# Patient Record
Sex: FEMALE | Race: WHITE | HISPANIC OR LATINO | Employment: OTHER | URBAN - METROPOLITAN AREA
[De-identification: names, ages, dates, MRNs, and addresses within clinical notes are randomized per-mention and may not be internally consistent; named-entity substitution may affect disease eponyms.]

---

## 2023-09-06 PROBLEM — R91.1 LUNG NODULE: Status: ACTIVE | Noted: 2023-09-06

## 2023-09-06 PROBLEM — I50.9 CONGESTIVE HEART FAILURE (MULTI): Status: ACTIVE | Noted: 2023-09-06

## 2023-09-06 PROBLEM — I10 HYPERTENSION: Status: ACTIVE | Noted: 2023-09-06

## 2023-09-06 PROBLEM — M81.0 AGE-RELATED OSTEOPOROSIS WITHOUT CURRENT PATHOLOGICAL FRACTURE: Status: ACTIVE | Noted: 2023-09-06

## 2023-09-06 PROBLEM — M81.0 OSTEOPOROSIS: Status: ACTIVE | Noted: 2023-09-06

## 2023-09-06 PROBLEM — I24.1 POSTMYOCARDIAL INFARCTION SYNDROME (MULTI): Status: ACTIVE | Noted: 2023-09-06

## 2023-09-06 PROBLEM — I82.409 DVT (DEEP VENOUS THROMBOSIS) (MULTI): Status: ACTIVE | Noted: 2023-09-06

## 2023-09-06 PROBLEM — E78.49 OTHER HYPERLIPIDEMIA: Status: ACTIVE | Noted: 2023-09-06

## 2023-09-06 PROBLEM — E55.9 VITAMIN D DEFICIENCY: Status: ACTIVE | Noted: 2023-09-06

## 2023-09-06 PROBLEM — I25.10 ARTERIOSCLEROTIC HEART DISEASE: Status: ACTIVE | Noted: 2023-09-06

## 2023-09-06 PROBLEM — J96.90 RESPIRATORY FAILURE (MULTI): Status: ACTIVE | Noted: 2023-09-06

## 2023-09-06 PROBLEM — K21.9 ESOPHAGEAL REFLUX: Status: ACTIVE | Noted: 2023-09-06

## 2023-09-06 PROBLEM — E03.9 HYPOTHYROIDISM: Status: ACTIVE | Noted: 2023-09-06

## 2023-09-06 RX ORDER — LEVOTHYROXINE SODIUM 75 UG/1
1 TABLET ORAL DAILY
COMMUNITY
Start: 2017-08-25 | End: 2023-10-26 | Stop reason: WASHOUT

## 2023-09-06 RX ORDER — LEVOTHYROXINE SODIUM 88 UG/1
1 TABLET ORAL
COMMUNITY
End: 2023-10-26

## 2023-09-06 RX ORDER — ASPIRIN 81 MG/1
1 TABLET ORAL DAILY
COMMUNITY

## 2023-09-06 RX ORDER — CARVEDILOL 3.12 MG/1
1 TABLET ORAL 2 TIMES DAILY
COMMUNITY
Start: 2017-03-17 | End: 2023-12-18

## 2023-09-06 RX ORDER — FUROSEMIDE 40 MG/1
1 TABLET ORAL DAILY
COMMUNITY
Start: 2017-03-17 | End: 2023-12-18

## 2023-09-06 RX ORDER — ALENDRONATE SODIUM 70 MG/1
1 TABLET ORAL WEEKLY
COMMUNITY
End: 2023-12-04

## 2023-09-06 RX ORDER — ATORVASTATIN CALCIUM 20 MG/1
1 TABLET, FILM COATED ORAL DAILY
COMMUNITY
End: 2023-12-18

## 2023-09-06 RX ORDER — LISINOPRIL 2.5 MG/1
1 TABLET ORAL DAILY
COMMUNITY
Start: 2017-06-11 | End: 2023-12-18

## 2023-09-06 RX ORDER — PANTOPRAZOLE SODIUM 40 MG/1
1 TABLET, DELAYED RELEASE ORAL DAILY
COMMUNITY
Start: 2017-03-17 | End: 2023-12-18

## 2023-09-06 RX ORDER — TERIPARATIDE 250 UG/ML
20 INJECTION, SOLUTION SUBCUTANEOUS DAILY
COMMUNITY
Start: 2017-11-16 | End: 2024-01-08

## 2023-10-26 DIAGNOSIS — Z76.0 MEDICATION REFILL: ICD-10-CM

## 2023-10-26 RX ORDER — LEVOTHYROXINE SODIUM 88 UG/1
88 TABLET ORAL
Qty: 70 TABLET | Refills: 4 | Status: SHIPPED | OUTPATIENT
Start: 2023-10-26

## 2023-12-01 DIAGNOSIS — M81.0 OSTEOPOROSIS, UNSPECIFIED OSTEOPOROSIS TYPE, UNSPECIFIED PATHOLOGICAL FRACTURE PRESENCE: ICD-10-CM

## 2023-12-04 RX ORDER — ALENDRONATE SODIUM 70 MG/1
70 TABLET ORAL
Qty: 12 TABLET | Refills: 3 | Status: SHIPPED | OUTPATIENT
Start: 2023-12-04

## 2023-12-17 DIAGNOSIS — K21.9 GASTROESOPHAGEAL REFLUX DISEASE WITHOUT ESOPHAGITIS: ICD-10-CM

## 2023-12-17 DIAGNOSIS — E78.49 OTHER HYPERLIPIDEMIA: ICD-10-CM

## 2023-12-17 DIAGNOSIS — I10 PRIMARY HYPERTENSION: Primary | ICD-10-CM

## 2023-12-18 RX ORDER — PANTOPRAZOLE SODIUM 40 MG/1
40 TABLET, DELAYED RELEASE ORAL DAILY
Qty: 90 TABLET | Refills: 3 | Status: SHIPPED | OUTPATIENT
Start: 2023-12-18

## 2023-12-18 RX ORDER — FUROSEMIDE 40 MG/1
40 TABLET ORAL DAILY
Qty: 90 TABLET | Refills: 3 | Status: SHIPPED | OUTPATIENT
Start: 2023-12-18

## 2023-12-18 RX ORDER — LISINOPRIL 2.5 MG/1
2.5 TABLET ORAL DAILY
Qty: 90 TABLET | Refills: 3 | Status: SHIPPED | OUTPATIENT
Start: 2023-12-18

## 2023-12-18 RX ORDER — CARVEDILOL 3.12 MG/1
3.12 TABLET ORAL 2 TIMES DAILY
Qty: 180 TABLET | Refills: 3 | Status: SHIPPED | OUTPATIENT
Start: 2023-12-18

## 2023-12-18 RX ORDER — ATORVASTATIN CALCIUM 20 MG/1
20 TABLET, FILM COATED ORAL DAILY
Qty: 90 TABLET | Refills: 3 | Status: SHIPPED | OUTPATIENT
Start: 2023-12-18

## 2024-01-08 ENCOUNTER — OFFICE VISIT (OUTPATIENT)
Dept: CARDIOLOGY | Facility: CLINIC | Age: 69
End: 2024-01-08
Payer: COMMERCIAL

## 2024-01-08 VITALS
SYSTOLIC BLOOD PRESSURE: 104 MMHG | BODY MASS INDEX: 20.02 KG/M2 | HEART RATE: 80 BPM | OXYGEN SATURATION: 99 % | DIASTOLIC BLOOD PRESSURE: 70 MMHG | WEIGHT: 113 LBS

## 2024-01-08 DIAGNOSIS — I25.10 ARTERIOSCLEROTIC HEART DISEASE: Primary | ICD-10-CM

## 2024-01-08 DIAGNOSIS — I50.22 CHRONIC SYSTOLIC CONGESTIVE HEART FAILURE (MULTI): ICD-10-CM

## 2024-01-08 DIAGNOSIS — I10 PRIMARY HYPERTENSION: ICD-10-CM

## 2024-01-08 DIAGNOSIS — E78.49 OTHER HYPERLIPIDEMIA: ICD-10-CM

## 2024-01-08 PROCEDURE — 1036F TOBACCO NON-USER: CPT | Performed by: INTERNAL MEDICINE

## 2024-01-08 PROCEDURE — 1159F MED LIST DOCD IN RCRD: CPT | Performed by: INTERNAL MEDICINE

## 2024-01-08 PROCEDURE — 1125F AMNT PAIN NOTED PAIN PRSNT: CPT | Performed by: INTERNAL MEDICINE

## 2024-01-08 PROCEDURE — 3078F DIAST BP <80 MM HG: CPT | Performed by: INTERNAL MEDICINE

## 2024-01-08 PROCEDURE — 1160F RVW MEDS BY RX/DR IN RCRD: CPT | Performed by: INTERNAL MEDICINE

## 2024-01-08 PROCEDURE — 99213 OFFICE O/P EST LOW 20 MIN: CPT | Performed by: INTERNAL MEDICINE

## 2024-01-08 PROCEDURE — 3074F SYST BP LT 130 MM HG: CPT | Performed by: INTERNAL MEDICINE

## 2024-01-08 ASSESSMENT — PAIN SCALES - GENERAL: PAINLEVEL: 5

## 2024-01-08 ASSESSMENT — ENCOUNTER SYMPTOMS
GASTROINTESTINAL NEGATIVE: 1
OCCASIONAL FEELINGS OF UNSTEADINESS: 0
BLOATING: 0
NEUROLOGICAL NEGATIVE: 1
CONSTITUTIONAL NEGATIVE: 1
ENDOCRINE NEGATIVE: 1
DEPRESSION: 0
LOSS OF SENSATION IN FEET: 0
RESPIRATORY NEGATIVE: 1
EYES NEGATIVE: 1
COUGH: 0
FEVER: 0
MUSCULOSKELETAL NEGATIVE: 1

## 2024-01-08 ASSESSMENT — PATIENT HEALTH QUESTIONNAIRE - PHQ9
1. LITTLE INTEREST OR PLEASURE IN DOING THINGS: NOT AT ALL
SUM OF ALL RESPONSES TO PHQ9 QUESTIONS 1 AND 2: 0
2. FEELING DOWN, DEPRESSED OR HOPELESS: NOT AT ALL

## 2024-01-08 NOTE — PROGRESS NOTES
Subjective      Chief Complaint   Patient presents with     6 month follow up    ASHD ARTERIOSCLEROTIC HEART DISEASE          She has a history of having a anterior wall microinfarction in 2016 was rather extensive she has a clot.  She has been maintained on Eliquis for this clot.  The last echocardiogram was in 2018 showed ejection fraction of 30 to 34% range left atrium was dilated.  Is active. She is not complaining of chest discomfort.  No complaints of PND or orthopnea.  The legs are not swelling on her.  NO palpitaions.  The BP is doing well  Chol was done last July and is normal           Review of Systems   Constitutional: Negative. Negative for fever.   HENT: Negative.     Eyes: Negative.    Respiratory: Negative.  Negative for cough.    Endocrine: Negative.    Skin: Negative.    Musculoskeletal: Negative.    Gastrointestinal: Negative.  Negative for bloating.   Genitourinary: Negative.    Neurological: Negative.    All other systems reviewed and are negative.       Past Surgical History:   Procedure Laterality Date    CORONARY ANGIOPLASTY WITH STENT PLACEMENT  11/01/2017    Cath Placement Of Stent 1        Active Ambulatory Problems     Diagnosis Date Noted    Vitamin D deficiency 09/06/2023    Respiratory failure (CMS/Formerly [facility]) 09/06/2023    Postmyocardial infarction syndrome (CMS/Formerly [facility]) 09/06/2023    Other hyperlipidemia 09/06/2023    Osteoporosis 09/06/2023    Lung nodule 09/06/2023    Hypothyroidism 09/06/2023    Hypertension 09/06/2023    Esophageal reflux 09/06/2023    DVT (deep venous thrombosis) (CMS/Formerly [facility]) 09/06/2023    Congestive heart failure (CMS/Formerly [facility]) 09/06/2023    Arteriosclerotic heart disease 09/06/2023    Age-related osteoporosis without current pathological fracture 09/06/2023     Resolved Ambulatory Problems     Diagnosis Date Noted    No Resolved Ambulatory Problems     Past Medical History:   Diagnosis Date    Acute MI, anterior wall (CMS/Formerly [facility])     Bilateral pneumonia         Visit Vitals  BP  "104/70   Pulse 80   Wt 51.3 kg (113 lb)   SpO2 99%   BMI 20.02 kg/m²   Smoking Status Former   BSA 1.51 m²        Objective     Constitutional:       Appearance: Healthy appearance.   Eyes:      Pupils: Pupils are equal, round, and reactive to light.   Neck:      Vascular: JVD normal.   Pulmonary:      Breath sounds: Normal breath sounds.   Cardiovascular:      PMI at left midclavicular line. Normal rate. Regular rhythm. Normal S1. Normal S2.       Murmurs: There is no murmur.      No gallop.  No click. No rub.   Pulses:     Intact distal pulses.   Edema:     Peripheral edema absent.   Abdominal:      Palpations: Abdomen is soft.      Tenderness: There is no abdominal tenderness.   Musculoskeletal:      Extremities: No clubbing present.Skin:     General: Skin is warm and dry.   Neurological:      General: No focal deficit present.            Lab Review:         Lab Results   Component Value Date    CHOL 147 07/25/2023    CHOL 126 (L) 07/13/2022    CHOL 157 06/21/2021     Lab Results   Component Value Date    HDL 63 07/25/2023    HDL 54 07/13/2022    HDL 68 06/21/2021     Lab Results   Component Value Date    LDLCALC 64 (L) 07/25/2023    LDLCALC 56 (L) 07/13/2022    LDLCALC 71 06/21/2021     Lab Results   Component Value Date    TRIG 100 07/25/2023    TRIG 82 07/13/2022    TRIG 91 06/21/2021     No components found for: \"CHOLHDL\"     Assessment/Plan     Arteriosclerotic heart disease  Is doing well and no angina and no chf.    Congestive heart failure (CMS/HCC)  No symptoms of the chf    Hypertension  Is doing well    Other hyperlipidemia  Is controlled     "

## 2024-03-21 DIAGNOSIS — I50.22 CHRONIC SYSTOLIC CONGESTIVE HEART FAILURE (MULTI): Primary | ICD-10-CM

## 2024-03-22 RX ORDER — APIXABAN 5 MG/1
5 TABLET, FILM COATED ORAL 2 TIMES DAILY
Qty: 200 TABLET | Refills: 2 | Status: SHIPPED | OUTPATIENT
Start: 2024-03-22

## 2024-07-13 NOTE — PROGRESS NOTES
Subjective      Chief Complaint   Patient presents with    Follow-up          She has a history of having a anterior wall myocardial infarction in 2016 was rather extensive she has a clot.  She has been maintained on Eliquis for this clot.  The last echocardiogram was in 2018 showed ejection fraction of 30 to 34% range left atrium was dilated.    She is not complaining of chest discomfort.  No complaints of PND or orthopnea.  The legs are not swelling on her.  NO palpitaions.  Is sob with hot weather.  The chol is doing well  The BP is doing well           ROS     Past Surgical History:   Procedure Laterality Date    CORONARY ANGIOPLASTY WITH STENT PLACEMENT  11/01/2017    Cath Placement Of Stent 1        Active Ambulatory Problems     Diagnosis Date Noted    Vitamin D deficiency 09/06/2023    Respiratory failure (Multi) 09/06/2023    Postmyocardial infarction syndrome (Multi) 09/06/2023    Other hyperlipidemia 09/06/2023    Osteoporosis 09/06/2023    Lung nodule 09/06/2023    Hypothyroidism 09/06/2023    Hypertension 09/06/2023    Esophageal reflux 09/06/2023    DVT (deep venous thrombosis) (Multi) 09/06/2023    Congestive heart failure (Multi) 09/06/2023    Arteriosclerotic heart disease 09/06/2023    Age-related osteoporosis without current pathological fracture 09/06/2023     Resolved Ambulatory Problems     Diagnosis Date Noted    No Resolved Ambulatory Problems     Past Medical History:   Diagnosis Date    Acute MI, anterior wall (Multi)     Bilateral pneumonia     COPD (chronic obstructive pulmonary disease) (Multi) 07/06/2016        Visit Vitals  /69   Pulse 85   Wt 48.5 kg (107 lb)   SpO2 96%   BMI 18.95 kg/m²   Smoking Status Former   BSA 1.47 m²        Objective     Constitutional:       Appearance: Healthy appearance.   Neck:      Vascular: No JVR.   Pulmonary:      Effort: Pulmonary effort is normal.      Breath sounds: Normal breath sounds.   Cardiovascular:      PMI at left midclavicular line.  "Normal rate. Regular rhythm. Normal S1. Normal S2.       Murmurs: There is no murmur.      No gallop.  No click. No rub.   Pulses:     Intact distal pulses.   Abdominal:      Palpations: Abdomen is soft.   Musculoskeletal: Normal range of motion. Skin:     General: Skin is warm and dry.   Neurological:      General: No focal deficit present.            Lab Review:         Lab Results   Component Value Date    CHOL 147 07/25/2023    CHOL 126 (L) 07/13/2022    CHOL 157 06/21/2021     Lab Results   Component Value Date    HDL 63 07/25/2023    HDL 54 07/13/2022    HDL 68 06/21/2021     Lab Results   Component Value Date    LDLCALC 64 (L) 07/25/2023    LDLCALC 56 (L) 07/13/2022    LDLCALC 71 06/21/2021     Lab Results   Component Value Date    TRIG 100 07/25/2023    TRIG 82 07/13/2022    TRIG 91 06/21/2021     No components found for: \"CHOLHDL\"     Assessment/Plan     Arteriosclerotic heart disease  She is doing well and is 8 years from the MI and is active.  No angina and no chf.    Hypertension  Is doing well    Other hyperlipidemia  Is controlled     "

## 2024-07-15 ENCOUNTER — OFFICE VISIT (OUTPATIENT)
Dept: CARDIOLOGY | Facility: CLINIC | Age: 69
End: 2024-07-15
Payer: COMMERCIAL

## 2024-07-15 VITALS
WEIGHT: 107 LBS | OXYGEN SATURATION: 96 % | SYSTOLIC BLOOD PRESSURE: 122 MMHG | DIASTOLIC BLOOD PRESSURE: 69 MMHG | BODY MASS INDEX: 18.95 KG/M2 | HEART RATE: 85 BPM

## 2024-07-15 DIAGNOSIS — I10 PRIMARY HYPERTENSION: ICD-10-CM

## 2024-07-15 DIAGNOSIS — E78.49 OTHER HYPERLIPIDEMIA: ICD-10-CM

## 2024-07-15 DIAGNOSIS — I25.10 ARTERIOSCLEROTIC HEART DISEASE: Primary | ICD-10-CM

## 2024-07-15 PROCEDURE — 1125F AMNT PAIN NOTED PAIN PRSNT: CPT | Performed by: INTERNAL MEDICINE

## 2024-07-15 PROCEDURE — 1159F MED LIST DOCD IN RCRD: CPT | Performed by: INTERNAL MEDICINE

## 2024-07-15 PROCEDURE — 1036F TOBACCO NON-USER: CPT | Performed by: INTERNAL MEDICINE

## 2024-07-15 PROCEDURE — 3074F SYST BP LT 130 MM HG: CPT | Performed by: INTERNAL MEDICINE

## 2024-07-15 PROCEDURE — 99213 OFFICE O/P EST LOW 20 MIN: CPT | Performed by: INTERNAL MEDICINE

## 2024-07-15 PROCEDURE — 3078F DIAST BP <80 MM HG: CPT | Performed by: INTERNAL MEDICINE

## 2024-07-15 ASSESSMENT — ENCOUNTER SYMPTOMS
DEPRESSION: 0
LOSS OF SENSATION IN FEET: 0
OCCASIONAL FEELINGS OF UNSTEADINESS: 0

## 2024-07-15 ASSESSMENT — PATIENT HEALTH QUESTIONNAIRE - PHQ9
SUM OF ALL RESPONSES TO PHQ9 QUESTIONS 1 AND 2: 0
1. LITTLE INTEREST OR PLEASURE IN DOING THINGS: NOT AT ALL
2. FEELING DOWN, DEPRESSED OR HOPELESS: NOT AT ALL

## 2024-07-15 ASSESSMENT — PAIN SCALES - GENERAL: PAINLEVEL: 5

## 2024-07-18 ENCOUNTER — OFFICE VISIT (OUTPATIENT)
Dept: PRIMARY CARE | Facility: CLINIC | Age: 69
End: 2024-07-18
Payer: COMMERCIAL

## 2024-07-18 VITALS
WEIGHT: 108 LBS | DIASTOLIC BLOOD PRESSURE: 70 MMHG | BODY MASS INDEX: 19.13 KG/M2 | HEART RATE: 72 BPM | OXYGEN SATURATION: 94 % | SYSTOLIC BLOOD PRESSURE: 112 MMHG | TEMPERATURE: 97.2 F

## 2024-07-18 DIAGNOSIS — E55.9 VITAMIN D DEFICIENCY: ICD-10-CM

## 2024-07-18 DIAGNOSIS — Z12.31 ENCOUNTER FOR SCREENING MAMMOGRAM FOR MALIGNANT NEOPLASM OF BREAST: ICD-10-CM

## 2024-07-18 DIAGNOSIS — Z00.00 ANNUAL PHYSICAL EXAM: Primary | ICD-10-CM

## 2024-07-18 DIAGNOSIS — M81.0 AGE-RELATED OSTEOPOROSIS WITHOUT CURRENT PATHOLOGICAL FRACTURE: ICD-10-CM

## 2024-07-18 PROBLEM — Z86.718 HISTORY OF DVT (DEEP VEIN THROMBOSIS): Status: ACTIVE | Noted: 2023-09-06

## 2024-07-18 PROCEDURE — 1160F RVW MEDS BY RX/DR IN RCRD: CPT | Performed by: FAMILY MEDICINE

## 2024-07-18 PROCEDURE — 1159F MED LIST DOCD IN RCRD: CPT | Performed by: FAMILY MEDICINE

## 2024-07-18 PROCEDURE — 1125F AMNT PAIN NOTED PAIN PRSNT: CPT | Performed by: FAMILY MEDICINE

## 2024-07-18 PROCEDURE — 3078F DIAST BP <80 MM HG: CPT | Performed by: FAMILY MEDICINE

## 2024-07-18 PROCEDURE — 99397 PER PM REEVAL EST PAT 65+ YR: CPT | Performed by: FAMILY MEDICINE

## 2024-07-18 PROCEDURE — 99215 OFFICE O/P EST HI 40 MIN: CPT | Performed by: FAMILY MEDICINE

## 2024-07-18 PROCEDURE — 1123F ACP DISCUSS/DSCN MKR DOCD: CPT | Performed by: FAMILY MEDICINE

## 2024-07-18 PROCEDURE — G0439 PPPS, SUBSEQ VISIT: HCPCS | Performed by: FAMILY MEDICINE

## 2024-07-18 PROCEDURE — 1158F ADVNC CARE PLAN TLK DOCD: CPT | Performed by: FAMILY MEDICINE

## 2024-07-18 PROCEDURE — 3074F SYST BP LT 130 MM HG: CPT | Performed by: FAMILY MEDICINE

## 2024-07-18 ASSESSMENT — LIFESTYLE VARIABLES
AUDIT-C TOTAL SCORE: 1
HOW MANY STANDARD DRINKS CONTAINING ALCOHOL DO YOU HAVE ON A TYPICAL DAY: 1 OR 2
HOW OFTEN DO YOU HAVE A DRINK CONTAINING ALCOHOL: MONTHLY OR LESS
SKIP TO QUESTIONS 9-10: 1
HOW OFTEN DO YOU HAVE SIX OR MORE DRINKS ON ONE OCCASION: NEVER

## 2024-07-18 ASSESSMENT — PAIN SCALES - GENERAL: PAINLEVEL: 5

## 2024-07-18 NOTE — PROGRESS NOTES
History Of Present Illness  Frannie Chamorro is a 69 y.o. female presenting for a wellness exam.    Preventative screenings  Last mammogram 8/3/2023.  Cologuard was - 8/18/2022.  Last Pap 7/30/2020.  She did receive her Prevnar 23 in 2020 and Prevnar 20 in 2022.  She declines tetanus and Shingrix vaccines today.    Other medical issues/concerns  ASHD - sees Dr. Blackwell.  Stable on carvedilol, Lasix, lisinopril, Eliquis.  Blood pressure at goal.    Hypothyroidism - on levothyroxine.  Clinically euthyroid.    Osteoporosis - DEXAs 2021, 2023 showing osteoporosis. On Alendronate. Did not tolerate Forteo.     COPD on Spiriva.  Breathing stable.    Patient Care Team:  Shyam Valentine MD as PCP - General (Family Medicine)  Umm Vines MD as PCP - [employer] PCP  David Blackwell MD as Referring Physician (Cardiology)     Geriatric screening    Retired. Lives alone. Ambulates without assistance.  Still active    General health: Good  Exercise: walks, [employer] aerobics classes  Caffeine: Limited   Diet: Balanced  Alcohol use: 1 a week    Functional ability:   No cognitive impairment observed.    No cognitive impairment reported by patient or family.    ADL screening:  Hearing without difficulties.  Independent in bathing, dressing, walking in home    IADL screening:  Independent in managing finances, grocery shopping, taking medications, doing housework    Home Safety:   [place] Home safety risk factors: No loose rugs, poor lighting, stairs without rails, bathroom with no grab bars    Depression screening:  Patient Health Questionnaire-2 Score: 0     The ASCVD Risk score (Katy PIMENTEL, et al., 2019) failed to calculate for the following reasons:    The patient has a prior MI or stroke diagnosis       Past Medical History  Patient Active Problem List    Diagnosis Date Noted    Vitamin D deficiency 09/06/2023    Postmyocardial infarction syndrome (Multi) 09/06/2023    Other hyperlipidemia 09/06/2023    Osteoporosis  09/06/2023    Lung nodule 09/06/2023    Hypothyroidism 09/06/2023    Hypertension 09/06/2023    Esophageal reflux 09/06/2023    History of DVT (deep vein thrombosis) 09/06/2023    Congestive heart failure (Multi) 09/06/2023    Arteriosclerotic heart disease 09/06/2023    Age-related osteoporosis without current pathological fracture 09/06/2023        Medications  Medications and current supplements were reviewed and recorded.   Does the patient use opiate medications:  No .  Current Outpatient Medications   Medication Sig Dispense Refill    alendronate (Fosamax) 70 mg tablet TAKE 1 TABLET BY MOUTH ONCE  WEEKLY 12 tablet 3    aspirin 81 mg EC tablet Take 1 tablet (81 mg) by mouth once daily.      atorvastatin (Lipitor) 20 mg tablet TAKE 1 TABLET BY MOUTH ONCE  DAILY 90 tablet 3    carvedilol (Coreg) 3.125 mg tablet TAKE 1 TABLET BY MOUTH TWICE  DAILY 180 tablet 3    Eliquis 5 mg tablet TAKE 1 TABLET BY MOUTH TWICE  DAILY 200 tablet 2    furosemide (Lasix) 40 mg tablet TAKE 1 TABLET BY MOUTH ONCE  DAILY 90 tablet 3    Lactobacillus acidophilus (PROBIOTIC ORAL) Take by mouth.      levothyroxine (Synthroid, Levoxyl) 88 mcg tablet TAKE 1 TABLET BY MOUTH IN THE  MORNING ON AN EMPTY STOMACH 70 tablet 4    lisinopril 2.5 mg tablet TAKE 1 TABLET BY MOUTH ONCE  DAILY 90 tablet 3    multivit-min/iron/folic acid/K (ADULTS MULTIVITAMIN ORAL) As directed oral      pantoprazole (ProtoNix) 40 mg EC tablet TAKE 1 TABLET BY MOUTH ONCE  DAILY (Patient taking differently: Take 1 tablet (40 mg) by mouth. Pt stated she only take 1 tab 4 days out the week) 90 tablet 3    tiotropium bromide (SPIRIVA RESPIMAT INHL) Inhale.       No current facility-administered medications for this visit.        Surgical History  She has a past surgical history that includes Coronary angioplasty with stent (11/01/2017).     Social History  She reports that she quit smoking about 8 years ago. Her smoking use included cigarettes. She has never used smokeless  tobacco. She reports current alcohol use of about 1.0 standard drink of alcohol per week. She reports that she does not use drugs.    Family History  Family History   Problem Relation Name Age of Onset    Arthritis Mother Shonda Chamorro     Cancer Father DEBORAH JAMES Pedro Pablo Sr     Leukemia Father DEBORAH Chamorro Sr         Allergies  Patient has no known allergies.    Immunizations  Immunization History   Administered Date(s) Administered    Pfizer COVID-19 vaccine, Fall 2023, 12 years and older, (30mcg/0.3mL) 09/16/2023    Pfizer COVID-19 vaccine, bivalent, age 12 years and older (30 mcg/0.3 mL) 09/15/2022    Pfizer Gray Cap SARS-CoV-2 04/12/2022    Pfizer Purple Cap SARS-CoV-2 02/28/2021, 03/21/2021, 09/25/2021    Pneumococcal conjugate vaccine, 13-valent (PREVNAR 13) 09/03/2020    Pneumococcal conjugate vaccine, 20-valent (PREVNAR 20) 09/02/2022        ROS  Negative, except as discussed in HPI     Vitals  /70   Pulse 72   Temp 36.2 °C (97.2 °F)   Wt 49 kg (108 lb)   SpO2 94%   BMI 19.13 kg/m²      Physical Exam  Vitals and nursing note reviewed.   Constitutional:       General: She is not in acute distress.     Appearance: Normal appearance.   Cardiovascular:      Rate and Rhythm: Normal rate and regular rhythm.      Heart sounds: Normal heart sounds.   Pulmonary:      Effort: No respiratory distress.      Breath sounds: Normal breath sounds.   Neurological:      General: No focal deficit present.      Mental Status: She is alert. Mental status is at baseline.         Relevant Results  Lab Results   Component Value Date    WBC 6.7 07/25/2023    WBC 6.1 07/13/2022    HGB 13.9 07/25/2023    HGB 13.5 07/13/2022    HCT 41.6 07/25/2023    HCT 41.0 07/13/2022    MCV 92.7 07/25/2023    MCV 94.3 07/13/2022     07/25/2023     07/13/2022     Lab Results   Component Value Date     07/19/2024     07/25/2023    K 4.1 07/19/2024    K 3.8 07/25/2023     07/19/2024     07/25/2023     CO2 26 07/19/2024    CO2 26 07/25/2023    BUN 19 07/19/2024    BUN 16 07/25/2023    CREATININE 1.00 07/19/2024    CREATININE 0.9 07/25/2023    CALCIUM 9.8 07/19/2024    CALCIUM 9.2 07/25/2023    PROT 6.7 07/19/2024    PROT 7.0 07/25/2023    BILITOT 0.4 07/19/2024    BILITOT 0.4 07/25/2023    ALKPHOS 95 07/19/2024    ALKPHOS 84 07/25/2023    ALT 12 07/19/2024    ALT 10 07/25/2023    AST 18 07/19/2024    AST 18 07/25/2023    GLUCOSE 102 (H) 07/19/2024    GLUCOSE 103 (H) 07/25/2023     Lab Results   Component Value Date    HGBA1C 5.8 (H) 07/19/2024     Lab Results   Component Value Date    TSH 0.34 07/19/2024      Lab Results   Component Value Date    CHOL 151 07/19/2024    TRIG 94 07/19/2024    HDL 71.0 07/19/2024           Assessment/Plan   Frannie was seen today for annual exam.  Diagnoses and all orders for this visit:  Annual physical exam (Primary)  -     Comprehensive Metabolic Panel; Future  -     Lipid Panel; Future  -     TSH with reflex to Free T4 if abnormal; Future  -     Hemoglobin A1C; Future  Encounter for screening mammogram for malignant neoplasm of breast  -     BI mammo bilateral screening tomosynthesis; Future  Vitamin D deficiency  -     Vitamin D 25-Hydroxy,Total (for eval of Vitamin D levels); Future  Age-related osteoporosis without current pathological fracture  Comments:  Continue Fosamax, repeat DEXA in 2025       There are no discontinued medications.     Counseling:   Medication education:   -Education:  The patient is counseled regarding potential side-effects of any and all new medications  -Understanding:  Patient expressed understanding of information discussed today  -Adherence:  No barriers to adherence identified    Advanced care planning: HCPOA discussed    Final treatment plan is a result of shared decision making with patient.         Shyam Valentine MD    Statement Selected

## 2024-07-19 ENCOUNTER — LAB (OUTPATIENT)
Dept: LAB | Facility: LAB | Age: 69
End: 2024-07-19
Payer: COMMERCIAL

## 2024-07-19 DIAGNOSIS — E55.9 VITAMIN D DEFICIENCY: ICD-10-CM

## 2024-07-19 DIAGNOSIS — Z00.00 ANNUAL PHYSICAL EXAM: ICD-10-CM

## 2024-07-19 LAB
25(OH)D3 SERPL-MCNC: 33 NG/ML (ref 31–100)
ALBUMIN SERPL-MCNC: 4.1 G/DL (ref 3.5–5)
ALP BLD-CCNC: 95 U/L (ref 35–125)
ALT SERPL-CCNC: 12 U/L (ref 5–40)
ANION GAP SERPL CALC-SCNC: 13 MMOL/L
AST SERPL-CCNC: 18 U/L (ref 5–40)
BILIRUB SERPL-MCNC: 0.4 MG/DL (ref 0.1–1.2)
BUN SERPL-MCNC: 19 MG/DL (ref 8–25)
CALCIUM SERPL-MCNC: 9.8 MG/DL (ref 8.5–10.4)
CHLORIDE SERPL-SCNC: 100 MMOL/L (ref 97–107)
CHOLEST SERPL-MCNC: 151 MG/DL (ref 133–200)
CHOLEST/HDLC SERPL: 2.1 {RATIO}
CO2 SERPL-SCNC: 26 MMOL/L (ref 24–31)
CREAT SERPL-MCNC: 1 MG/DL (ref 0.4–1.6)
EGFRCR SERPLBLD CKD-EPI 2021: 60 ML/MIN/1.73M*2
EST. AVERAGE GLUCOSE BLD GHB EST-MCNC: 120 MG/DL
GLUCOSE SERPL-MCNC: 102 MG/DL (ref 65–99)
HBA1C MFR BLD: 5.8 %
HDLC SERPL-MCNC: 71 MG/DL
LDLC SERPL CALC-MCNC: 61 MG/DL (ref 65–130)
POTASSIUM SERPL-SCNC: 4.1 MMOL/L (ref 3.4–5.1)
PROT SERPL-MCNC: 6.7 G/DL (ref 5.9–7.9)
SODIUM SERPL-SCNC: 139 MMOL/L (ref 133–145)
TRIGL SERPL-MCNC: 94 MG/DL (ref 40–150)
TSH SERPL DL<=0.05 MIU/L-ACNC: 0.34 MIU/L (ref 0.27–4.2)

## 2024-07-19 PROCEDURE — 36415 COLL VENOUS BLD VENIPUNCTURE: CPT

## 2024-07-26 ENCOUNTER — HOSPITAL ENCOUNTER (OUTPATIENT)
Dept: RADIOLOGY | Facility: HOSPITAL | Age: 69
Discharge: HOME | End: 2024-07-26
Payer: COMMERCIAL

## 2024-07-26 DIAGNOSIS — R91.1 SOLITARY PULMONARY NODULE: ICD-10-CM

## 2024-07-26 PROCEDURE — 71250 CT THORAX DX C-: CPT

## 2024-07-30 ENCOUNTER — TELEPHONE (OUTPATIENT)
Dept: PRIMARY CARE | Facility: CLINIC | Age: 69
End: 2024-07-30
Payer: COMMERCIAL

## 2024-07-31 PROBLEM — J96.90 RESPIRATORY FAILURE (MULTI): Status: RESOLVED | Noted: 2023-09-06 | Resolved: 2024-07-31

## 2024-07-31 NOTE — TELEPHONE ENCOUNTER
Shyam Valentine MD  7/29/2024  9:33 PM EDT       Liver and kidney function are stable.  Cholesterol is about the same as it was before.  Normal vitamin D.  Normal thyroid function.  Not diabetic

## 2024-08-03 ENCOUNTER — HOSPITAL ENCOUNTER (OUTPATIENT)
Dept: RADIOLOGY | Facility: HOSPITAL | Age: 69
Discharge: HOME | End: 2024-08-03
Payer: COMMERCIAL

## 2024-08-03 VITALS — HEIGHT: 62 IN | BODY MASS INDEX: 20.06 KG/M2 | WEIGHT: 109 LBS

## 2024-08-03 DIAGNOSIS — Z12.31 ENCOUNTER FOR SCREENING MAMMOGRAM FOR MALIGNANT NEOPLASM OF BREAST: ICD-10-CM

## 2024-08-03 PROCEDURE — 77063 BREAST TOMOSYNTHESIS BI: CPT | Performed by: RADIOLOGY

## 2024-08-03 PROCEDURE — 77067 SCR MAMMO BI INCL CAD: CPT | Performed by: RADIOLOGY

## 2024-08-03 PROCEDURE — 77067 SCR MAMMO BI INCL CAD: CPT

## 2024-08-27 DIAGNOSIS — M81.0 OSTEOPOROSIS, UNSPECIFIED OSTEOPOROSIS TYPE, UNSPECIFIED PATHOLOGICAL FRACTURE PRESENCE: ICD-10-CM

## 2024-08-27 DIAGNOSIS — Z76.0 MEDICATION REFILL: ICD-10-CM

## 2024-08-27 RX ORDER — ALENDRONATE SODIUM 70 MG/1
70 TABLET ORAL
Qty: 12 TABLET | Refills: 3 | Status: SHIPPED | OUTPATIENT
Start: 2024-08-27

## 2024-08-27 RX ORDER — LEVOTHYROXINE SODIUM 88 UG/1
88 TABLET ORAL
Qty: 90 TABLET | Refills: 3 | Status: SHIPPED | OUTPATIENT
Start: 2024-08-27

## 2024-08-27 NOTE — TELEPHONE ENCOUNTER
Two prescriptions  (Newest Message First)  View All Conversations on this Encounter  Frannie Valentine [id number] Clinical Support Staff (supporting Shyam Valentine MD)11 hours ago (8:41 PM)     [facility] Doctor,  Now that you're my primary care physician, I will need two new prescription:  Levothyroxine tab 88mcg  Alendronate tab 70mg     I get all of my meds from [facility], 3 months at a time.  In the past, these were both annual prescriptions.     If your office could call in the prescriptions by mid-September, it would be much appreciated!     Thanks,  Frannie Chamorro

## 2024-08-30 DIAGNOSIS — I10 PRIMARY HYPERTENSION: ICD-10-CM

## 2024-08-30 DIAGNOSIS — K21.9 GASTROESOPHAGEAL REFLUX DISEASE WITHOUT ESOPHAGITIS: ICD-10-CM

## 2024-08-30 DIAGNOSIS — E78.49 OTHER HYPERLIPIDEMIA: ICD-10-CM

## 2024-09-03 RX ORDER — ATORVASTATIN CALCIUM 20 MG/1
20 TABLET, FILM COATED ORAL DAILY
Qty: 90 TABLET | Refills: 3 | Status: SHIPPED | OUTPATIENT
Start: 2024-09-03

## 2024-09-03 RX ORDER — LISINOPRIL 2.5 MG/1
2.5 TABLET ORAL DAILY
Qty: 90 TABLET | Refills: 3 | Status: SHIPPED | OUTPATIENT
Start: 2024-09-03

## 2024-09-03 RX ORDER — PANTOPRAZOLE SODIUM 40 MG/1
40 TABLET, DELAYED RELEASE ORAL DAILY
Qty: 90 TABLET | Refills: 3 | Status: SHIPPED | OUTPATIENT
Start: 2024-09-03

## 2024-09-03 RX ORDER — CARVEDILOL 3.12 MG/1
3.12 TABLET ORAL 2 TIMES DAILY
Qty: 180 TABLET | Refills: 3 | Status: SHIPPED | OUTPATIENT
Start: 2024-09-03

## 2024-09-03 RX ORDER — FUROSEMIDE 40 MG/1
40 TABLET ORAL DAILY
Qty: 90 TABLET | Refills: 3 | Status: SHIPPED | OUTPATIENT
Start: 2024-09-03

## 2025-01-08 ENCOUNTER — OFFICE VISIT (OUTPATIENT)
Dept: CARDIOLOGY | Facility: CLINIC | Age: 70
End: 2025-01-08
Payer: COMMERCIAL

## 2025-01-18 NOTE — PROGRESS NOTES
Subjective      Chief Complaint   Patient presents with    Follow-up          She has a history of having a anterior wall myocardial infarction in 2016 was rather extensive she has a clot.  She has been maintained on Eliquis for this clot.  The last echocardiogram was in 2018 showed ejection fraction of 30 to 34% range, the left atrium was dilated.    Is doing well and is active.  She is not complaining of chest discomfort.  No complaints of PND or orthopnea.  The legs are not swelling on her.  NO palpitaions.  She had an episode with walking fast of sob.     The BP is doing well and sometimes the BP is low oin the am in the 90's and does not last long  The Chol is doing well           Review of Systems   Constitutional: Negative. Negative for chills and fever.   HENT: Negative.     Eyes: Negative.    Respiratory:  Positive for cough.    Endocrine: Negative.    Skin: Negative.    Musculoskeletal: Negative.  Negative for falls.   Gastrointestinal: Negative.    Genitourinary: Negative.    Neurological: Negative.    All other systems reviewed and are negative.       Past Surgical History:   Procedure Laterality Date    CORONARY ANGIOPLASTY WITH STENT PLACEMENT  11/01/2017    Cath Placement Of Stent 1        Active Ambulatory Problems     Diagnosis Date Noted    Vitamin D deficiency 09/06/2023    Postmyocardial infarction syndrome (Multi) 09/06/2023    Other hyperlipidemia 09/06/2023    Osteoporosis 09/06/2023    Lung nodule 09/06/2023    Hypothyroidism 09/06/2023    Hypertension 09/06/2023    Esophageal reflux 09/06/2023    History of DVT (deep vein thrombosis) 09/06/2023    Congestive heart failure 09/06/2023    Arteriosclerotic heart disease 09/06/2023    Age-related osteoporosis without current pathological fracture 09/06/2023     Resolved Ambulatory Problems     Diagnosis Date Noted    Respiratory failure (Multi) 09/06/2023     Past Medical History:   Diagnosis Date    Acute MI, anterior wall (Multi)     Arthritis   "   Bilateral pneumonia     COPD (chronic obstructive pulmonary disease) (Multi) 07/06/2016    Disease of thyroid gland     Scoliosis     Varicella         Visit Vitals  /68   Pulse 72   Wt 45.8 kg (101 lb)   LMP  (LMP Unknown)   SpO2 95%   BMI 18.47 kg/m²   OB Status Postmenopausal   Smoking Status Former   BSA 1.42 m²        Objective     Constitutional:       Appearance: Healthy appearance.   Neck:      Vascular: No JVR.   Pulmonary:      Effort: Pulmonary effort is normal.      Breath sounds: Normal breath sounds.   Cardiovascular:      PMI at left midclavicular line. Normal rate. Regular rhythm. Normal S1. Normal S2.       Murmurs: There is no murmur.      No gallop.  No click. No rub.   Pulses:     Intact distal pulses.   Abdominal:      Palpations: Abdomen is soft.   Musculoskeletal: Normal range of motion. Skin:     General: Skin is warm and dry.   Neurological:      General: No focal deficit present.            Lab Review:         Lab Results   Component Value Date    CHOL 151 07/19/2024    CHOL 147 07/25/2023    CHOL 126 (L) 07/13/2022     Lab Results   Component Value Date    HDL 71.0 07/19/2024    HDL 63 07/25/2023    HDL 54 07/13/2022     Lab Results   Component Value Date    LDLCALC 61 (L) 07/19/2024    LDLCALC 64 (L) 07/25/2023    LDLCALC 56 (L) 07/13/2022     Lab Results   Component Value Date    TRIG 94 07/19/2024    TRIG 100 07/25/2023    TRIG 82 07/13/2022     No components found for: \"CHOLHDL\"     Assessment/Plan     Congestive heart failure (Multi)  She is doing well and has lost wt with covid and Noro virus recently.  No angina and no chf.  Thelast ef was 33 and did have an apical clot.  Is on the eliquis.    Hypertension  Is controlled    Other hyperlipidemia  Is controlled     "

## 2025-01-20 ENCOUNTER — OFFICE VISIT (OUTPATIENT)
Dept: CARDIOLOGY | Facility: CLINIC | Age: 70
End: 2025-01-20
Payer: COMMERCIAL

## 2025-01-20 VITALS
HEART RATE: 72 BPM | OXYGEN SATURATION: 95 % | SYSTOLIC BLOOD PRESSURE: 110 MMHG | WEIGHT: 101 LBS | DIASTOLIC BLOOD PRESSURE: 68 MMHG | BODY MASS INDEX: 18.47 KG/M2

## 2025-01-20 DIAGNOSIS — I10 PRIMARY HYPERTENSION: ICD-10-CM

## 2025-01-20 DIAGNOSIS — E78.49 OTHER HYPERLIPIDEMIA: ICD-10-CM

## 2025-01-20 DIAGNOSIS — I50.22 CHRONIC SYSTOLIC CONGESTIVE HEART FAILURE: Primary | ICD-10-CM

## 2025-01-20 PROCEDURE — 1126F AMNT PAIN NOTED NONE PRSNT: CPT | Performed by: INTERNAL MEDICINE

## 2025-01-20 PROCEDURE — 1123F ACP DISCUSS/DSCN MKR DOCD: CPT | Performed by: INTERNAL MEDICINE

## 2025-01-20 PROCEDURE — 3074F SYST BP LT 130 MM HG: CPT | Performed by: INTERNAL MEDICINE

## 2025-01-20 PROCEDURE — 99213 OFFICE O/P EST LOW 20 MIN: CPT | Performed by: INTERNAL MEDICINE

## 2025-01-20 PROCEDURE — 1036F TOBACCO NON-USER: CPT | Performed by: INTERNAL MEDICINE

## 2025-01-20 PROCEDURE — 3078F DIAST BP <80 MM HG: CPT | Performed by: INTERNAL MEDICINE

## 2025-01-20 ASSESSMENT — ENCOUNTER SYMPTOMS
CONSTITUTIONAL NEGATIVE: 1
GASTROINTESTINAL NEGATIVE: 1
ENDOCRINE NEGATIVE: 1
EYES NEGATIVE: 1
MUSCULOSKELETAL NEGATIVE: 1
LOSS OF SENSATION IN FEET: 0
FEVER: 0
DEPRESSION: 0
NEUROLOGICAL NEGATIVE: 1
OCCASIONAL FEELINGS OF UNSTEADINESS: 0
CHILLS: 0
FALLS: 0
COUGH: 1

## 2025-01-20 ASSESSMENT — LIFESTYLE VARIABLES: TOTAL SCORE: 0

## 2025-01-20 ASSESSMENT — PAIN SCALES - GENERAL: PAINLEVEL_OUTOF10: 0-NO PAIN

## 2025-01-20 NOTE — ASSESSMENT & PLAN NOTE
She is doing well and has lost wt with covid and Noro virus recently.  No angina and no chf.  Thelast ef was 33 and did have an apical clot.  Is on the eliquis.

## 2025-02-21 DIAGNOSIS — I50.22 CHRONIC SYSTOLIC CONGESTIVE HEART FAILURE: ICD-10-CM

## 2025-02-24 RX ORDER — APIXABAN 5 MG/1
5 TABLET, FILM COATED ORAL 2 TIMES DAILY
Qty: 200 TABLET | Refills: 2 | Status: SHIPPED | OUTPATIENT
Start: 2025-02-24

## 2025-04-09 DIAGNOSIS — Z12.11 SCREENING FOR COLON CANCER: Primary | ICD-10-CM

## 2025-04-10 ENCOUNTER — TELEPHONE (OUTPATIENT)
Dept: PRIMARY CARE | Facility: CLINIC | Age: 70
End: 2025-04-10
Payer: COMMERCIAL

## 2025-04-10 NOTE — TELEPHONE ENCOUNTER
Progress Notes    Shyam Valentine MD at 4/10/2025  9:05 AM    Status: Signed   Cologuard test, screening for colon cancer was positive.  This just means the neck step is to do a colonoscopy .  It is not alarming as it may be positive for polyps or falsely positive.  A referral for colonoscopy was placed so she can schedule for colonoscopy.  The endoscopy clinic will let her know the prep instructions.

## 2025-04-10 NOTE — PROGRESS NOTES
Cologuard test, screening for colon cancer was positive.  This just means the neck step is to do a colonoscopy .  It is not alarming as it may be positive for polyps or falsely positive.  A referral for colonoscopy was placed so she can schedule for colonoscopy.  The endoscopy clinic will let her know the prep instructions.

## 2025-05-10 DIAGNOSIS — E78.49 OTHER HYPERLIPIDEMIA: ICD-10-CM

## 2025-05-10 DIAGNOSIS — I10 PRIMARY HYPERTENSION: ICD-10-CM

## 2025-05-10 DIAGNOSIS — K21.9 GASTROESOPHAGEAL REFLUX DISEASE WITHOUT ESOPHAGITIS: ICD-10-CM

## 2025-05-10 DIAGNOSIS — Z76.0 MEDICATION REFILL: ICD-10-CM

## 2025-05-12 ENCOUNTER — PATIENT MESSAGE (OUTPATIENT)
Dept: PRIMARY CARE | Facility: CLINIC | Age: 70
End: 2025-05-12
Payer: COMMERCIAL

## 2025-05-12 DIAGNOSIS — N95.9 MENOPAUSAL DISORDER: Primary | ICD-10-CM

## 2025-05-12 DIAGNOSIS — Z12.31 ENCOUNTER FOR SCREENING MAMMOGRAM FOR MALIGNANT NEOPLASM OF BREAST: ICD-10-CM

## 2025-05-12 RX ORDER — FUROSEMIDE 40 MG/1
40 TABLET ORAL DAILY
Qty: 90 TABLET | Refills: 3 | Status: SHIPPED | OUTPATIENT
Start: 2025-05-12

## 2025-05-12 RX ORDER — CARVEDILOL 3.12 MG/1
3.12 TABLET ORAL 2 TIMES DAILY
Qty: 180 TABLET | Refills: 3 | Status: SHIPPED | OUTPATIENT
Start: 2025-05-12

## 2025-05-12 RX ORDER — LEVOTHYROXINE SODIUM 88 UG/1
TABLET ORAL
Qty: 100 TABLET | Refills: 1 | Status: SHIPPED | OUTPATIENT
Start: 2025-05-12

## 2025-05-12 RX ORDER — ATORVASTATIN CALCIUM 20 MG/1
20 TABLET, FILM COATED ORAL DAILY
Qty: 90 TABLET | Refills: 3 | Status: SHIPPED | OUTPATIENT
Start: 2025-05-12

## 2025-05-12 RX ORDER — LISINOPRIL 2.5 MG/1
2.5 TABLET ORAL DAILY
Qty: 90 TABLET | Refills: 3 | Status: SHIPPED | OUTPATIENT
Start: 2025-05-12

## 2025-05-12 RX ORDER — PANTOPRAZOLE SODIUM 40 MG/1
40 TABLET, DELAYED RELEASE ORAL DAILY
Qty: 90 TABLET | Refills: 3 | Status: SHIPPED | OUTPATIENT
Start: 2025-05-12

## 2025-05-12 NOTE — TELEPHONE ENCOUNTER
Prescription request received and populated   Pharmacy populated  Last Office Visit: 7/18/24 for physical

## 2025-06-06 DIAGNOSIS — M81.0 OSTEOPOROSIS, UNSPECIFIED OSTEOPOROSIS TYPE, UNSPECIFIED PATHOLOGICAL FRACTURE PRESENCE: ICD-10-CM

## 2025-06-09 NOTE — TELEPHONE ENCOUNTER
Prescription request received and populated   Pharmacy populated  Last Office Visit: 7/18/24 for physical  Has upcoming ov 9/04/25 for physical

## 2025-06-15 RX ORDER — ALENDRONATE SODIUM 70 MG/1
TABLET ORAL
Qty: 12 TABLET | Refills: 3 | Status: SHIPPED | OUTPATIENT
Start: 2025-06-15

## 2025-07-03 ENCOUNTER — APPOINTMENT (OUTPATIENT)
Dept: RADIOLOGY | Facility: HOSPITAL | Age: 70
End: 2025-07-03
Payer: COMMERCIAL

## 2025-07-15 ENCOUNTER — OFFICE VISIT (OUTPATIENT)
Facility: CLINIC | Age: 70
End: 2025-07-15
Payer: MEDICARE

## 2025-07-15 VITALS
SYSTOLIC BLOOD PRESSURE: 120 MMHG | HEART RATE: 64 BPM | WEIGHT: 103 LBS | OXYGEN SATURATION: 97 % | DIASTOLIC BLOOD PRESSURE: 70 MMHG | BODY MASS INDEX: 18.84 KG/M2

## 2025-07-15 DIAGNOSIS — I51.3 LEFT VENTRICULAR THROMBUS: ICD-10-CM

## 2025-07-15 DIAGNOSIS — I50.23 ACUTE ON CHRONIC SYSTOLIC (CONGESTIVE) HEART FAILURE: ICD-10-CM

## 2025-07-15 DIAGNOSIS — I25.10 ASHD (ARTERIOSCLEROTIC HEART DISEASE): Primary | ICD-10-CM

## 2025-07-15 DIAGNOSIS — I25.5 ISCHEMIC CARDIOMYOPATHY: ICD-10-CM

## 2025-07-15 DIAGNOSIS — I50.22 CHRONIC HEART FAILURE WITH REDUCED EJECTION FRACTION (HFREF, <= 40%): ICD-10-CM

## 2025-07-15 PROCEDURE — 99212 OFFICE O/P EST SF 10 MIN: CPT

## 2025-07-15 PROCEDURE — 1125F AMNT PAIN NOTED PAIN PRSNT: CPT | Performed by: INTERNAL MEDICINE

## 2025-07-15 PROCEDURE — 99214 OFFICE O/P EST MOD 30 MIN: CPT | Performed by: INTERNAL MEDICINE

## 2025-07-15 PROCEDURE — 1159F MED LIST DOCD IN RCRD: CPT | Performed by: INTERNAL MEDICINE

## 2025-07-15 PROCEDURE — 3078F DIAST BP <80 MM HG: CPT | Performed by: INTERNAL MEDICINE

## 2025-07-15 PROCEDURE — 1036F TOBACCO NON-USER: CPT | Performed by: INTERNAL MEDICINE

## 2025-07-15 PROCEDURE — 99213 OFFICE O/P EST LOW 20 MIN: CPT

## 2025-07-15 PROCEDURE — 3074F SYST BP LT 130 MM HG: CPT | Performed by: INTERNAL MEDICINE

## 2025-07-15 RX ORDER — SPIRONOLACTONE 25 MG/1
12.5 TABLET ORAL DAILY
Qty: 15 TABLET | Refills: 11 | Status: SHIPPED | OUTPATIENT
Start: 2025-07-15 | End: 2026-07-15

## 2025-07-15 ASSESSMENT — ENCOUNTER SYMPTOMS
OCCASIONAL FEELINGS OF UNSTEADINESS: 0
PND: 0
ORTHOPNEA: 0
CLAUDICATION: 0
COUGH: 0
IRREGULAR HEARTBEAT: 0
MYALGIAS: 0
WEAKNESS: 0
SHORTNESS OF BREATH: 0
FEVER: 0
DEPRESSION: 0
DYSPNEA ON EXERTION: 0
DIAPHORESIS: 0
WHEEZING: 0
LOSS OF SENSATION IN FEET: 0
WEIGHT GAIN: 0
SYNCOPE: 0
NEAR-SYNCOPE: 0
WEIGHT LOSS: 0
DIZZINESS: 0
PALPITATIONS: 0

## 2025-07-15 ASSESSMENT — PAIN SCALES - GENERAL: PAINLEVEL_OUTOF10: 5

## 2025-07-15 ASSESSMENT — PATIENT HEALTH QUESTIONNAIRE - PHQ9
2. FEELING DOWN, DEPRESSED OR HOPELESS: NOT AT ALL
1. LITTLE INTEREST OR PLEASURE IN DOING THINGS: NOT AT ALL
SUM OF ALL RESPONSES TO PHQ9 QUESTIONS 1 AND 2: 0

## 2025-07-15 ASSESSMENT — LIFESTYLE VARIABLES: TOTAL SCORE: 0

## 2025-07-15 NOTE — PROGRESS NOTES
Subjective      Chief Complaint   Patient presents with    6 month f/u        70-year-old female presents for cardiac follow-up.  She is a former patient of Dr. Blackwell.  She has a history of an anterior wall myocardial infarction in 2016 with LAD PCI that was complicated by left ventricular thrombus.  She was placed on Eliquis for anticoagulation.  Her ejection fraction at the time was 30 to 34%.  Her last echocardiogram was in 2018 showing an ejection fraction of 30 to 34%.  She was last seen by Dr. Blackwell 6 months ago and was doing reasonably well from a cardiac standpoint.  She was on carvedilol, lisinopril, and an oral diuretic.  It does not appear that she has a device. She has no chest pain, dyspnea is stable.            Review of Systems   Constitutional: Negative for diaphoresis, fever, weight gain and weight loss.   Eyes:  Negative for visual disturbance.   Cardiovascular:  Negative for chest pain, claudication, dyspnea on exertion, irregular heartbeat, leg swelling, near-syncope, orthopnea, palpitations, paroxysmal nocturnal dyspnea and syncope.   Respiratory:  Negative for cough, shortness of breath and wheezing.    Musculoskeletal:  Negative for muscle weakness and myalgias.   Neurological:  Negative for dizziness and weakness.   All other systems reviewed and are negative.       Medical History[1]     Surgical History[2]     Social History     Socioeconomic History    Marital status: Single     Spouse name: Not on file    Number of children: Not on file    Years of education: Not on file    Highest education level: Not on file   Occupational History    Not on file   Tobacco Use    Smoking status: Former     Current packs/day: 0.00     Types: Cigarettes     Quit date: 2016     Years since quittin.0    Smokeless tobacco: Never   Vaping Use    Vaping status: Never Used   Substance and Sexual Activity    Alcohol use: Yes     Alcohol/week: 1.0 standard drink of alcohol     Types: 1 Standard drinks or  equivalent per week     Comment: rare    Drug use: Never    Sexual activity: Not Currently     Partners: Male     Birth control/protection: Post-menopausal   Other Topics Concern    Not on file   Social History Narrative    Not on file     Social Drivers of Health     Financial Resource Strain: Not on file   Food Insecurity: Not on file   Transportation Needs: Not on file   Physical Activity: Not on file   Stress: Not on file   Social Connections: Not on file   Intimate Partner Violence: Not on file   Housing Stability: Not on file        Family History[3]     OBJECTIVE:    Vitals:    07/15/25 0852   BP: 120/70   Pulse: 64   SpO2: 97%        Vitals reviewed.   Constitutional:       Appearance: Normal and healthy appearance. Not in distress.   Pulmonary:      Effort: Pulmonary effort is normal.      Breath sounds: Normal breath sounds.   Cardiovascular:      Normal rate. Regular rhythm. Normal S1. Normal S2.       Murmurs: There is no murmur.      No gallop.  No click.   Pulses:     Intact distal pulses.   Edema:     Peripheral edema absent.   Skin:     General: Skin is warm and dry.   Neurological:      General: No focal deficit present.          Lab Review:   Lab Results   Component Value Date    CHOL 151 07/19/2024    TRIG 94 07/19/2024    HDL 71.0 07/19/2024       Lab Results   Component Value Date    LDLCALC 61 (L) 07/19/2024        ASHD (arteriosclerotic heart disease)  Continue statin, no need for asa while on eliquis. Lipids from last year reviewed LDL is at goal. Has lipids upcoming. Lifestyle modifications were discussed. I advocated for mediterranean and plant based eating. We also discussed exercise. 150 minutes a week of moderate intensity exercise is recommended per our ACC/AHA guidelines      Chronic heart failure with reduced ejection fraction (HFrEF, <= 40%)  Euvolemic on lasix and 2/4 GDMT. Adding low dose aldactone and checking a BMP in 2 weeks.     Ischemic cardiomyopathy  Checking echo to eval if  EF is still 30%. ICD eval pending    Left ventricular thrombus  Continue Eliquis. Echo with definity to eval, will dc if no thrombus            [1]   Past Medical History:  Diagnosis Date    Acute MI, anterior wall (Multi)     WITH APICAL CLOT 07/07/2016    Arthritis     Bilateral pneumonia     COPD (chronic obstructive pulmonary disease) (Multi) 07/06/2016    Disease of thyroid gland     Hypertension 07/06/2016    Osteoporosis     Respiratory failure     Scoliosis     Varicella    [2]   Past Surgical History:  Procedure Laterality Date    CORONARY ANGIOPLASTY WITH STENT PLACEMENT  11/01/2017    Cath Placement Of Stent 1   [3]   Family History  Problem Relation Name Age of Onset    Arthritis Mother Shonda Chamorro     Cancer Father DEBORAH Chamorro Sr     Leukemia Father DEBORAH Chamorro Sr

## 2025-07-15 NOTE — ASSESSMENT & PLAN NOTE
Continue statin, no need for asa while on eliquis. Lipids from last year reviewed LDL is at goal. Has lipids upcoming. Lifestyle modifications were discussed. I advocated for mediterranean and plant based eating. We also discussed exercise. 150 minutes a week of moderate intensity exercise is recommended per our ACC/AHA guidelines

## 2025-07-16 LAB
ANION GAP SERPL CALCULATED.4IONS-SCNC: 12 MMOL/L (CALC) (ref 7–17)
BUN SERPL-MCNC: 25 MG/DL (ref 7–25)
BUN/CREAT SERPL: NORMAL (CALC) (ref 6–22)
CALCIUM SERPL-MCNC: 9.8 MG/DL (ref 8.6–10.4)
CHLORIDE SERPL-SCNC: 99 MMOL/L (ref 98–110)
CO2 SERPL-SCNC: 27 MMOL/L (ref 20–32)
CREAT SERPL-MCNC: 0.96 MG/DL (ref 0.6–1)
EGFRCR SERPLBLD CKD-EPI 2021: 63 ML/MIN/1.73M2
GLUCOSE SERPL-MCNC: 96 MG/DL (ref 65–99)
POTASSIUM SERPL-SCNC: 4.4 MMOL/L (ref 3.5–5.3)
SODIUM SERPL-SCNC: 138 MMOL/L (ref 135–146)

## 2025-07-30 ENCOUNTER — HOSPITAL ENCOUNTER (OUTPATIENT)
Dept: CARDIOLOGY | Facility: HOSPITAL | Age: 70
Discharge: HOME | End: 2025-07-30
Payer: MEDICARE

## 2025-07-30 VITALS — SYSTOLIC BLOOD PRESSURE: 120 MMHG | DIASTOLIC BLOOD PRESSURE: 70 MMHG

## 2025-07-30 DIAGNOSIS — I51.3 LEFT VENTRICULAR THROMBUS: ICD-10-CM

## 2025-07-30 DIAGNOSIS — I50.33 ACUTE ON CHRONIC DIASTOLIC (CONGESTIVE) HEART FAILURE: ICD-10-CM

## 2025-07-30 DIAGNOSIS — I50.23 ACUTE ON CHRONIC SYSTOLIC (CONGESTIVE) HEART FAILURE: ICD-10-CM

## 2025-07-30 LAB
AORTIC VALVE MEAN GRADIENT: 1 MMHG
AORTIC VALVE PEAK VELOCITY: 0.84 M/S
AV PEAK GRADIENT: 3 MMHG
AVA (PEAK VEL): 2.78 CM2
AVA (VTI): 2.98 CM2
EJECTION FRACTION APICAL 4 CHAMBER: 31.8
EJECTION FRACTION: 28 %
LEFT ATRIUM VOLUME AREA LENGTH INDEX BSA: 23.8 ML/M2
LEFT VENTRICLE INTERNAL DIMENSION DIASTOLE: 4.25 CM (ref 3.5–6)
LEFT VENTRICULAR OUTFLOW TRACT DIAMETER: 1.86 CM
LV EJECTION FRACTION BIPLANE: 28 %
MITRAL VALVE E/A RATIO: 0.63
MITRAL VALVE E/E' RATIO: 8.53
RIGHT VENTRICLE FREE WALL PEAK S': 21.14 CM/S
RIGHT VENTRICLE PEAK SYSTOLIC PRESSURE: 25 MMHG
TRICUSPID ANNULAR PLANE SYSTOLIC EXCURSION: 2.1 CM

## 2025-07-30 PROCEDURE — 93306 TTE W/DOPPLER COMPLETE: CPT | Performed by: INTERNAL MEDICINE

## 2025-07-30 PROCEDURE — 2500000004 HC RX 250 GENERAL PHARMACY W/ HCPCS (ALT 636 FOR OP/ED): Mod: JW | Performed by: INTERNAL MEDICINE

## 2025-07-30 PROCEDURE — C8929 TTE W OR WO FOL WCON,DOPPLER: HCPCS

## 2025-07-30 RX ADMIN — PERFLUTREN 2 ML OF DILUTION: 6.52 INJECTION, SUSPENSION INTRAVENOUS at 11:41

## 2025-08-04 ENCOUNTER — HOSPITAL ENCOUNTER (OUTPATIENT)
Dept: RADIOLOGY | Facility: HOSPITAL | Age: 70
Discharge: HOME | End: 2025-08-04
Payer: MEDICARE

## 2025-08-04 ENCOUNTER — APPOINTMENT (OUTPATIENT)
Dept: RADIOLOGY | Facility: HOSPITAL | Age: 70
End: 2025-08-04
Payer: MEDICARE

## 2025-08-04 VITALS — HEIGHT: 62 IN | WEIGHT: 103 LBS | BODY MASS INDEX: 18.95 KG/M2

## 2025-08-04 DIAGNOSIS — Z12.31 ENCOUNTER FOR SCREENING MAMMOGRAM FOR MALIGNANT NEOPLASM OF BREAST: ICD-10-CM

## 2025-08-04 DIAGNOSIS — N95.9 MENOPAUSAL DISORDER: ICD-10-CM

## 2025-08-04 PROCEDURE — 77080 DXA BONE DENSITY AXIAL: CPT

## 2025-08-04 PROCEDURE — 77063 BREAST TOMOSYNTHESIS BI: CPT | Performed by: RADIOLOGY

## 2025-08-04 PROCEDURE — 77067 SCR MAMMO BI INCL CAD: CPT

## 2025-08-04 PROCEDURE — 77080 DXA BONE DENSITY AXIAL: CPT | Performed by: RADIOLOGY

## 2025-08-04 PROCEDURE — 77067 SCR MAMMO BI INCL CAD: CPT | Performed by: RADIOLOGY

## 2025-09-04 PROBLEM — I10 HYPERTENSION: Status: RESOLVED | Noted: 2023-09-06 | Resolved: 2025-09-04

## 2025-09-04 PROBLEM — I50.9 CONGESTIVE HEART FAILURE: Status: RESOLVED | Noted: 2023-09-06 | Resolved: 2025-09-04

## 2025-09-05 ENCOUNTER — SPECIALTY PHARMACY (OUTPATIENT)
Dept: PHARMACY | Facility: CLINIC | Age: 70
End: 2025-09-05

## 2025-09-05 ENCOUNTER — APPOINTMENT (OUTPATIENT)
Facility: CLINIC | Age: 70
End: 2025-09-05
Payer: MEDICARE